# Patient Record
Sex: MALE | Race: WHITE | NOT HISPANIC OR LATINO | Employment: OTHER | URBAN - METROPOLITAN AREA
[De-identification: names, ages, dates, MRNs, and addresses within clinical notes are randomized per-mention and may not be internally consistent; named-entity substitution may affect disease eponyms.]

---

## 2017-10-18 ENCOUNTER — OFFICE VISIT (OUTPATIENT)
Dept: UROLOGY | Facility: CLINIC | Age: 82
End: 2017-10-18
Payer: COMMERCIAL

## 2017-10-18 VITALS
HEIGHT: 68 IN | SYSTOLIC BLOOD PRESSURE: 122 MMHG | HEART RATE: 80 BPM | DIASTOLIC BLOOD PRESSURE: 80 MMHG | BODY MASS INDEX: 28.49 KG/M2 | WEIGHT: 188 LBS

## 2017-10-18 DIAGNOSIS — N40.0 ENLARGED PROSTATE: Primary | ICD-10-CM

## 2017-10-18 DIAGNOSIS — N40.0 ENLARGED PROSTATE: ICD-10-CM

## 2017-10-18 PROCEDURE — 51798 US URINE CAPACITY MEASURE: CPT | Performed by: UROLOGY

## 2017-10-18 PROCEDURE — 99203 OFFICE O/P NEW LOW 30 MIN: CPT | Mod: 25 | Performed by: UROLOGY

## 2017-10-18 ASSESSMENT — PAIN SCALES - GENERAL: PAINLEVEL: NO PAIN (0)

## 2017-10-18 NOTE — NURSING NOTE
Chief Complaint   Patient presents with     Benign Prostatic Hypertrophy     Patient is here for a 2nd opinion on enlarged prostate.     Lianna Ruiz LPN 2:33 PM October 18, 2017

## 2017-10-18 NOTE — PROGRESS NOTES
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 He has a PSA of 4.1 and a clinical stage of T1c. He has a 74 cc prostate volume and has no difficulty voiding. His postvoid residual recently was checked and was 41 cc.  He was diagnosed by Kendall Daniels M.D.-50 pages of outside records reviewed  He was referred to me by Jose Alberto Zepeda M.D.  Other past medical history: Aortic stenosis, former smoker  Medications: None  Allergies: None  Exam: Patient is here with his spouse. Normal appearance, normal vital signs, alert and oriented, normocephalic, normal respirations, neuro grossly intact  Assessment: Clinical stage TIc adenocarcinoma of the prostate-discussed all options, risks and follow-up. Do not recommend surgery, seed implants or cryotherapy. Discussed hormonal therapy alone or with radiation therapy, external radiation therapy, surveillance  Suggestions: Recommend the patient consider external radiation because of some grade 4 disease. His risk of progression on surveillance is moderate in the next 5 years.  He will see Daniel Zarco M.D. at [facility] regarding IMRT. No hormonal therapy recommended. See me in 4 weeks after radiation treatments are completed with PSA. Call with questions

## 2017-10-18 NOTE — LETTER
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 He has a PSA of 4.1 and a clinical stage of T1c. He has a 74 cc prostate volume and has no difficulty voiding. His postvoid residual recently was checked and was 41 cc.  He was diagnosed by Kendall Daniels M.D.-50 pages of outside records reviewed  He was referred to me by Jose Alberto Zepeda M.D.  Other past medical history: Aortic stenosis, former smoker  Medications: None  Allergies: None  Exam: Patient is here with his spouse. Normal appearance, normal vital signs, alert and oriented, normocephalic, normal respirations, neuro grossly intact  Assessment: Clinical stage TIc adenocarcinoma of the prostate-discussed all options, risks and follow-up. Do not recommend surgery, seed implants or cryotherapy. Discussed hormonal therapy alone or with radiation therapy, external radiation therapy, surveillance  Suggestions: Recommend the patient consider external radiation because of some grade 4 disease. His risk of progression on surveillance is moderate in the next 5 years.  He will see Daniel Zarco M.D. at [facility] regarding IMRT. No hormonal therapy recommended. See me in 4 weeks after radiation treatments are completed with PSA. Call with questions      Sudheer Jurado MD

## 2017-10-18 NOTE — MR AVS SNAPSHOT
"              After Visit Summary   10/18/2017    Dae Carpenter    MRN: [id number]           Patient Information     Date Of Birth          1935        Visit Information        Provider Department      10/18/2017 2:20 PM Sudheer Jurado MD [facility] [place]        Today's Diagnoses     Enlarged prostate           Follow-ups after your visit        Who to contact     If you have questions or need follow up information about today's clinic visit or your schedule please contact [facility]OLOG[facility] CL[facility] [place] directly at [phone].  Normal or non-critical lab and imaging results will be communicated to you by [employer], letter or phone within 4 business days after the clinic has received the results. If you do not hear from us within 7 days, please contact the clinic through Vimtyt or phone. If you have a critical or abnormal lab result, we will notify you by phone as soon as possible.  Submit refill requests through WorldRemit or call your pharmacy and they will forward the refill request to us. Please allow 3 business days for your refill to be completed.          Additional Information About Your Visit        MyChart Information     WorldRemit lets you send messages to your doctor, view your test results, renew your prescriptions, schedule appointments and more. To sign up, go to [email or web address] . Click on \"Log in\" on the left side of the screen, which will take you to the Welcome page. Then click on \"Sign up Now\" on the right side of the page.     You will be asked to enter the access code listed below, as well as some personal information. Please follow the directions to create your username and password.     Your access code is: [email or web address]  Expires: 2018  3:22 PM     Your access code will  in 90 days. If you need help or a new code, please call your [facility] or [phone].        Care EveryWhere ID     This is your Care " "EveryWhere ID. This could be used by other organizations to access your [place] medical records  [id number]        Your Vitals Were     Pulse Height BMI (Body Mass Index)             80 1.727 m (5' 8\") 28.59 kg/m2          Blood Pressure from Last 3 Encounters:   10/18/17 122/80   09/25/15 134/85    Weight from Last 3 Encounters:   10/18/17 85.3 kg (188 lb)   09/25/15 89.7 kg (197 lb 11.2 oz)              We Performed the Following     MEASURE POST-VOID RESIDUAL URINE/BLADDER CAPACITY, US NON-IMAGING     UA without Microscopic        Primary Care Provider Office Phone # Fax #    Nilesh Giron [phone]       [facility] [address] [facility] [address]        Equal Access to Services     YAMILET NATHAN : Hadii delvis ku hadasho Soomaali, waaxda luqadaha, qaybta kaalmada adeegyada, fadi sparks haynicole gilmore . So [facility] [phone].    ATENCIÓN: Si habla español, tiene a sibley disposición servicios gratuitos de asistencia lingüística. Kareename al [phone].    We comply with applicable federal civil rights laws and [place] laws. We do not discriminate on the basis of race, color, national origin, age, disability, sex, sexual orientation, or gender identity.            Thank you!     Thank you for choosing [facility]OLOG[facility] CL[facility] [place]  for your care. Our goal is always to provide you with excellent care. Hearing back from our patients is one way we can continue to improve our services. Please take a few minutes to complete the written survey that you may receive in the mail after your visit with us. Thank you!             Your Updated Medication List - Protect others around you: Learn how to safely use, store and throw away your medicines at www.disposemymeds.org.      Notice  As of 10/18/2017  3:22 PM    You have not been prescribed any medications.      "

## 2017-10-18 NOTE — LETTER
10/18/2017       RE: Dae Carpenter  [address]  [place] [facility] [facility] [address]     Dear Colleague,    Thank you for referring your patient, Dae Carpenter, to the [facility] [place] at [facility]. Please see a copy of my visit note below.    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 He has a PSA of 4.1 and a clinical stage of T1c. He has a 74 cc prostate volume and has no difficulty voiding. His postvoid residual recently was checked and was 41 cc.  He was diagnosed by Kendall Daniels M.D.-50 pages of outside records reviewed  He was referred to me by Jose Alberto Zepeda M.D.  Other past medical history: Aortic stenosis, former smoker  Medications: None  Allergies: None  Exam: Patient is here with his spouse. Normal appearance, normal vital signs, alert and oriented, normocephalic, normal respirations, neuro grossly intact  Assessment: Clinical stage TIc adenocarcinoma of the prostate-discussed all options, risks and follow-up. Do not recommend surgery, seed implants or cryotherapy. Discussed hormonal therapy alone or with radiation therapy, external radiation therapy, surveillance  Suggestions: Recommend the patient consider external radiation because of some grade 4 disease. His risk of progression on surveillance is moderate in the next 5 years.  He will see Daniel Zarco M.D. at [facility] regarding IMRT. No hormonal therapy recommended. See me in 4 weeks after radiation treatments are completed with PSA. Call with questions      Again, thank you for allowing me to participate in the care of your patient.      Sincerely,    Sudheer Jurado MD

## 2017-10-25 ENCOUNTER — TRANSFERRED RECORDS (OUTPATIENT)
Dept: HEALTH INFORMATION MANAGEMENT | Facility: CLINIC | Age: 82
End: 2017-10-25

## 2017-11-22 ENCOUNTER — HOSPITAL ENCOUNTER (OUTPATIENT)
Dept: GENERAL RADIOLOGY | Facility: CLINIC | Age: 82
Discharge: HOME | End: 2017-11-22
Attending: INTERNAL MEDICINE | Admitting: INTERNAL MEDICINE
Payer: MEDICARE

## 2017-11-22 DIAGNOSIS — K59.00 CONSTIPATION, UNSPECIFIED CONSTIPATION TYPE: ICD-10-CM

## 2017-11-22 PROCEDURE — 74000 XR ABDOMEN 1 VW: CPT

## 2017-12-21 ENCOUNTER — TRANSFERRED RECORDS (OUTPATIENT)
Dept: HEALTH INFORMATION MANAGEMENT | Facility: CLINIC | Age: 82
End: 2017-12-21

## 2018-01-29 DIAGNOSIS — N40.0 ENLARGED PROSTATE: ICD-10-CM

## 2018-01-29 DIAGNOSIS — N40.0 ENLARGED PROSTATE: Primary | ICD-10-CM

## 2018-01-29 LAB — PSA SERPL-MCNC: 1.6 NG/ML (ref 0–4)

## 2018-01-29 PROCEDURE — 84153 ASSAY OF PSA TOTAL: CPT | Performed by: UROLOGY

## 2018-01-29 PROCEDURE — 36415 COLL VENOUS BLD VENIPUNCTURE: CPT | Performed by: UROLOGY

## 2018-01-30 ENCOUNTER — OFFICE VISIT (OUTPATIENT)
Dept: UROLOGY | Facility: CLINIC | Age: 83
End: 2018-01-30
Payer: COMMERCIAL

## 2018-01-30 VITALS
SYSTOLIC BLOOD PRESSURE: 120 MMHG | WEIGHT: 190 LBS | HEIGHT: 68 IN | DIASTOLIC BLOOD PRESSURE: 62 MMHG | BODY MASS INDEX: 28.79 KG/M2 | HEART RATE: 80 BPM

## 2018-01-30 DIAGNOSIS — C61 MALIGNANT NEOPLASM OF PROSTATE (H): Primary | ICD-10-CM

## 2018-01-30 PROCEDURE — 99212 OFFICE O/P EST SF 10 MIN: CPT | Performed by: UROLOGY

## 2018-01-30 ASSESSMENT — PAIN SCALES - GENERAL: PAINLEVEL: NO PAIN (0)

## 2018-01-30 NOTE — NURSING NOTE
Has completed RARX  On 12-22-17 . Urinating ok. Did have some frequency with the treatments however that is improving. PSA done yesterday. Laura Soler LPN

## 2018-01-30 NOTE — MR AVS SNAPSHOT
"              After Visit Summary   1/30/2018    Dae Carpenter    MRN: [id number]           Patient Information     Date Of Birth          1935        Visit Information        Provider Department      1/30/2018 9:20 AM Sudheer Jurado MD [facility]         Follow-ups after your visit        Your next 10 appointments already scheduled     Jul 27, 2018  8:00 AM CDT   Return Visit with Sudheer Jurado MD   [facility]rology [facility] ([facility]    [address]  [address]  [place] [address] [phone]              Who to contact     If you have questions or need follow up information about today's clinic visit or your schedule please contact [facility] UROLOGY [facility] directly at [phone].  Normal or non-critical lab and imaging results will be communicated to you by Tryolabshart, letter or phone within 4 business days after the clinic has received the results. If you do not hear from us within 7 days, please contact the clinic through Tryolabshart or phone. If you have a critical or abnormal lab result, we will notify you by phone as soon as possible.  Submit refill requests through [employer] or call your pharmacy and they will forward the refill request to us. Please allow 3 business days for your refill to be completed.          Additional Information About Your Visit        MyChart Information     [employer] lets you send messages to your doctor, view your test results, renew your prescriptions, schedule appointments and more. To sign up, go to [email or web address] . Click on \"Log in\" on the left side of the screen, which will take you to the Welcome page. Then click on \"Sign up Now\" on the right side of the page.     You will be asked to enter the access code listed below, as well as some personal information. Please follow the directions to create your username and password.     Your " "access code is: [email or web address]  Expires: 2018  9:40 AM     Your access code will  in 90 days. If you need help or a new code, please call your [facility] or [phone].        Care EveryWhere ID     This is your Care EveryWhere ID. This could be used by other organizations to access your [place] medical records  [id number]        Your Vitals Were     Pulse Height BMI (Body Mass Index)             80 1.727 m (5' 8\") 28.89 kg/m2          Blood Pressure from Last 3 Encounters:   18 120/62   10/18/17 122/80   09/25/15 134/85    Weight from Last 3 Encounters:   18 86.2 kg (190 lb)   10/18/17 85.3 kg (188 lb)   09/25/15 89.7 kg (197 lb 11.2 oz)              Today, you had the following     No orders found for display       Primary Care Provider Office Phone # Fax #    Nilesh Giron [phone]       [facility] [address] [address]        Equal Access to Services     [facility]: Hadii aad ku hadasho Soomaali, waaxda luqadaha, qaybta kaalmada adeegyada, fadi sparks haynicole gilmore . So [facility] [phone].    ATENCIÓN: Si habla español, tiene a sibley disposición servicios gratuitos de asistencia lingüística. Llame al [phone].    We comply with applicable federal civil rights laws and [place] laws. We do not discriminate on the basis of race, color, national origin, age, disability, sex, sexual orientation, or gender identity.            Thank you!     Thank you for choosing [facility]OLOG[facility] CL[facility] [place]  for your care. Our goal is always to provide you with excellent care. Hearing back from our patients is one way we can continue to improve our services. Please take a few minutes to complete the written survey that you may receive in the mail after your visit with us. Thank you!             Your Updated Medication List - Protect others around you: Learn how to safely use, store and throw away your " medicines at www.disposemymeds.org.      Notice  As of 1/30/2018  9:40 AM    You have not been prescribed any medications.

## 2018-01-30 NOTE — PROGRESS NOTES
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 He recently completed a course of external radiation at [facility] with Daniel Zarco M.D.  He experienced some fatigue during the treatments; this is improving steadily and he and his wife are traveling to [place] for 2 months  Other past medical history: Aortic stenosis, former smoker  Medications: None  Allergies: None  Exam: Normal appearance, normal vital signs, alert and oriented, normocephalic, normal respirations, neuro grossly intact  Assessment: Clinical stage TIc, grade 3+4 adenocarcinoma prostate  PSA down to 1.60  Plan: See me again in 6 months for PSA

## 2018-01-30 NOTE — LETTER
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 He recently completed a course of external radiation at [facility] with Daniel Zarco M.D.  He experienced some fatigue during the treatments; this is improving steadily and he and his wife are traveling to [place] for 2 months  Other past medical history: Aortic stenosis, former smoker  Medications: None  Allergies: None  Exam: Normal appearance, normal vital signs, alert and oriented, normocephalic, normal respirations, neuro grossly intact  Assessment: Clinical stage TIc, grade 3+4 adenocarcinoma prostate  PSA down to 1.60  Plan: See me again in 6 months for PSA    Sudheer Jurado MD

## 2018-01-30 NOTE — LETTER
1/30/2018       RE: Dae Carpenter  [address]  [place] [address]     Dear Colleague,    Thank you for referring your patient, Dae Carpenter, to the [facility] UROLOGY CL[facility] [place] at [facility]. Please see a copy of my visit note below.    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 He recently completed a course of external radiation at [facility] with Daniel Zarco M.D.  He experienced some fatigue during the treatments; this is improving steadily and he and his wife are traveling to [place] for 2 months  Other past medical history: Aortic stenosis, former smoker  Medications: None  Allergies: None  Exam: Normal appearance, normal vital signs, alert and oriented, normocephalic, normal respirations, neuro grossly intact  Assessment: Clinical stage TIc, grade 3+4 adenocarcinoma prostate  PSA down to 1.60  Plan: See me again in 6 months for PSA    Again, thank you for allowing me to participate in the care of your patient.      Sincerely,    Sudheer Jurado MD

## 2018-05-02 LAB — EJECTION FRACTION: 58

## 2018-05-29 ENCOUNTER — HOSPITAL ENCOUNTER (EMERGENCY)
Facility: CLINIC | Age: 83
Discharge: HOME | End: 2018-05-29
Attending: EMERGENCY MEDICINE | Admitting: EMERGENCY MEDICINE
Payer: MEDICARE

## 2018-05-29 VITALS
OXYGEN SATURATION: 94 % | TEMPERATURE: 97.8 F | HEIGHT: 68 IN | RESPIRATION RATE: 16 BRPM | BODY MASS INDEX: 28.79 KG/M2 | SYSTOLIC BLOOD PRESSURE: 139 MMHG | WEIGHT: 190 LBS | DIASTOLIC BLOOD PRESSURE: 72 MMHG

## 2018-05-29 DIAGNOSIS — R42 DIZZINESS: ICD-10-CM

## 2018-05-29 LAB
ANION GAP SERPL CALCULATED.3IONS-SCNC: 7 MMOL/L (ref 3–14)
BASOPHILS # BLD AUTO: 0 10E9/L (ref 0–0.2)
BASOPHILS NFR BLD AUTO: 0.5 %
BUN SERPL-MCNC: 12 MG/DL (ref 7–30)
CALCIUM SERPL-MCNC: 8.4 MG/DL (ref 8.5–10.1)
CHLORIDE SERPL-SCNC: 102 MMOL/L (ref 94–109)
CO2 SERPL-SCNC: 27 MMOL/L (ref 20–32)
CREAT SERPL-MCNC: 0.91 MG/DL (ref 0.66–1.25)
DIFFERENTIAL METHOD BLD: ABNORMAL
EOSINOPHIL # BLD AUTO: 0.2 10E9/L (ref 0–0.7)
EOSINOPHIL NFR BLD AUTO: 2.4 %
ERYTHROCYTE [DISTWIDTH] IN BLOOD BY AUTOMATED COUNT: 13.1 % (ref 10–15)
GFR SERPL CREATININE-BSD FRML MDRD: 80 ML/MIN/1.7M2
GLUCOSE SERPL-MCNC: 161 MG/DL (ref 70–99)
HCT VFR BLD AUTO: 46.6 % (ref 40–53)
HGB BLD-MCNC: 16.1 G/DL (ref 13.3–17.7)
IMM GRANULOCYTES # BLD: 0.1 10E9/L (ref 0–0.4)
IMM GRANULOCYTES NFR BLD: 0.6 %
INTERPRETATION ECG - MUSE: NORMAL
LYMPHOCYTES # BLD AUTO: 3.4 10E9/L (ref 0.8–5.3)
LYMPHOCYTES NFR BLD AUTO: 42.7 %
MCH RBC QN AUTO: 33.2 PG (ref 26.5–33)
MCHC RBC AUTO-ENTMCNC: 34.5 G/DL (ref 31.5–36.5)
MCV RBC AUTO: 96 FL (ref 78–100)
MONOCYTES # BLD AUTO: 0.4 10E9/L (ref 0–1.3)
MONOCYTES NFR BLD AUTO: 5.3 %
NEUTROPHILS # BLD AUTO: 3.9 10E9/L (ref 1.6–8.3)
NEUTROPHILS NFR BLD AUTO: 48.5 %
NRBC # BLD AUTO: 0 10*3/UL
NRBC BLD AUTO-RTO: 0 /100
PLATELET # BLD AUTO: 164 10E9/L (ref 150–450)
POTASSIUM SERPL-SCNC: 4.1 MMOL/L (ref 3.4–5.3)
RBC # BLD AUTO: 4.85 10E12/L (ref 4.4–5.9)
SODIUM SERPL-SCNC: 136 MMOL/L (ref 133–144)
TROPONIN I SERPL-MCNC: <0.015 UG/L (ref 0–0.04)
WBC # BLD AUTO: 8.1 10E9/L (ref 4–11)

## 2018-05-29 PROCEDURE — 93005 ELECTROCARDIOGRAM TRACING: CPT

## 2018-05-29 PROCEDURE — 85025 COMPLETE CBC W/AUTO DIFF WBC: CPT | Performed by: EMERGENCY MEDICINE

## 2018-05-29 PROCEDURE — 80048 BASIC METABOLIC PNL TOTAL CA: CPT | Performed by: EMERGENCY MEDICINE

## 2018-05-29 PROCEDURE — 99284 EMERGENCY DEPT VISIT MOD MDM: CPT

## 2018-05-29 PROCEDURE — 84484 ASSAY OF TROPONIN QUANT: CPT | Performed by: EMERGENCY MEDICINE

## 2018-05-29 ASSESSMENT — ENCOUNTER SYMPTOMS
DIZZINESS: 1
COUGH: 1
VOMITING: 0
DIARRHEA: 0
FEVER: 0
SINUS PRESSURE: 1
WEAKNESS: 0
NECK PAIN: 0
HEADACHES: 0

## 2018-05-29 NOTE — ED AVS SNAPSHOT
Emergency Department    [address] [place] [address]    Phone:  [phone]    Fax:  [phone]                                       Dae Carpenter   MRN: [id number]    Department:   Emergency Department   Date of Visit:  5/29/2018           After Visit Summary Signature Page     I have received my discharge instructions, and my questions have been answered. I have discussed any challenges I see with this plan with the nurse or doctor.    ..........................................................................................................................................  Patient/Patient Representative Signature      ..........................................................................................................................................  Patient Representative Print Name and Relationship to Patient    ..................................................               ................................................  Date                                            Time    ..........................................................................................................................................  Reviewed by Signature/Title    ...................................................              ..............................................  Date                                                            Time

## 2018-05-29 NOTE — ED AVS SNAPSHOT
Emergency Department    [address] [facility] [address]    Phone:  [phone]    Fax:  [phone]                                       Dae Carpenter   MRN: [id number]    Department:   Emergency Department   Date of Visit:  5/29/2018           Patient Information     Date Of Birth          1935        Your diagnoses for this visit were:     Dizziness        You were seen by Reece Walker MD.      Follow-up Information     Follow up with Nilesh Giron In 3 days.    Specialty:  Internal Medicine    Contact information:    [facility]  [address]  [facility] [address] [phone]          Follow up with  Emergency Department.    Specialty:  EMERGENCY MEDICINE    Why:  As needed    Contact information:    [address] [facility] [address] [phone]        Discharge Instructions       Discharge Instructions  Dizziness (Lightheaded)  Today you were seen for dizziness.  Dizziness can be caused by many things and it can be very difficult to determine the cause of dizziness.  At this time, your provider has found no signs that your dizziness is due to a serious or life-threatening condition. However, sometimes there is a serious problem that does not show up right away, and it is important for you to follow up with your regular provider as instructed.  Generally, every Emergency Department visit should have a follow-up clinic visit with either a primary or a specialty clinic/provider. Please follow-up as instructed by your emergency provider today.      Return to the Emergency Department if:      You pass out (fainting or falling out), especially during exercise.      You develop chest pain, chest pressure or difficulty breathing.    Your feel an irregular heartbeat.    You have excessive vaginal bleeding, or blood in your stool or vomit (throw up).    You have a high fever.    Your symptoms get worse or more frequent.    If when  you begin to feel dizzy or lightheaded, it is important to sit down or lay down immediately to prevent injury from falling.  If you were given a prescription for medicine here today, be sure to read all of the information (including the package insert) that comes with your prescription.  This will include important information about the medicine, its side effects, and any warnings that you need to know about.  The pharmacist who fills the prescription can provide more information and answer questions you may have about the medicine.  If you have questions or concerns that the pharmacist cannot address, please call or return to the Emergency Department.   Remember that you can always come back to the Emergency Department if you are not able to see your regular provider in the amount of time listed above, if you get any new symptoms, or if there is anything that worries you.     Your next 10 appointments already scheduled     Jul 27, 2018  8:30 AM CDT   Return Visit with Sudheer Jurado MD   [facility] ([facility])    [address]  [address]  [facility] [address] [phone]              24 Hour Appointment Hotline       To make an appointment at any [facility], call [phone] ([phone]). If you don't have a family doctor or clinic, we will help you find one. [facility] are conveniently located to serve the needs of you and your family.             Review of your medicines      Notice     You have not been prescribed any medications.            Procedures and tests performed during your visit     Basic metabolic panel    CBC with platelets differential    EKG 12-lead, tracing only    Peripheral IV catheter    Troponin I      Orders Needing Specimen Collection     None      Pending Results     Date and Time Order Name Status Description    5/29/2018 1135 EKG 12-lead, tracing only Preliminary             Pending Culture Results     No  orders found from 5/27/2018 to 5/30/2018.            Pending Results Instructions     If you had any lab results that were not finalized at the time of your Discharge, you can call the ED Lab Result RN at [phone]. You will be contacted by this team for any positive Lab results or changes in treatment. The nurses are available 7 days a week from 10A to 6:30P.  You can leave a message 24 hours per day and they will return your call.        Test Results From Your Hospital Stay        5/29/2018 12:01 PM      Component Results     Component Value Ref Range & Units Status    WBC 8.1 4.0 - 11.0 10e9/L Final    RBC Count 4.85 4.4 - 5.9 10e12/L Final    Hemoglobin 16.1 13.3 - 17.7 g/dL Final    Hematocrit 46.6 40.0 - 53.0 % Final    MCV 96 78 - 100 fl Final    MCH 33.2 (H) 26.5 - 33.0 pg Final    MCHC 34.5 31.5 - 36.5 g/dL Final    RDW 13.1 10.0 - 15.0 % Final    Platelet Count 164 150 - 450 10e9/L Final    Diff Method Automated Method  Final    % Neutrophils 48.5 % Final    % Lymphocytes 42.7 % Final    % Monocytes 5.3 % Final    % Eosinophils 2.4 % Final    % Basophils 0.5 % Final    % Immature Granulocytes 0.6 % Final    Nucleated RBCs 0 0 /100 Final    Absolute Neutrophil 3.9 1.6 - 8.3 10e9/L Final    Absolute Lymphocytes 3.4 0.8 - 5.3 10e9/L Final    Absolute Monocytes 0.4 0.0 - 1.3 10e9/L Final    Absolute Eosinophils 0.2 0.0 - 0.7 10e9/L Final    Absolute Basophils 0.0 0.0 - 0.2 10e9/L Final    Abs Immature Granulocytes 0.1 0 - 0.4 10e9/L Final    Absolute Nucleated RBC 0.0  Final         5/29/2018 12:22 PM      Component Results     Component Value Ref Range & Units Status    Sodium 136 133 - 144 mmol/L Final    Potassium 4.1 3.4 - 5.3 mmol/L Final    Chloride 102 94 - 109 mmol/L Final    Carbon Dioxide 27 20 - 32 mmol/L Final    Anion Gap 7 3 - 14 mmol/L Final    Glucose 161 (H) 70 - 99 mg/dL Final    Urea Nitrogen 12 7 - 30 mg/dL Final    Creatinine 0.91 0.66 - 1.25 mg/dL Final    GFR Estimate 80 >60  mL/min/1.7m2 Final    Non  GFR Calc    GFR Estimate If Black >90 >60 mL/min/1.7m2 Final    African American GFR Calc    Calcium 8.4 (L) 8.5 - 10.1 mg/dL Final         5/29/2018 12:25 PM      Component Results     Component Value Ref Range & Units Status    Troponin I ES <0.015 0.000 - 0.045 ug/L Final    The 99th percentile for upper reference range is 0.045 ug/L.  Troponin values   in the range of 0.045 - 0.120 ug/L may be associated with risks of adverse   clinical events.                  Clinical Quality Measure: Blood Pressure Screening     Your blood pressure was checked while you were in the emergency department today. The last reading we obtained was  BP: 139/72 . Please read the guidelines below about what these numbers mean and what you should do about them.  If your systolic blood pressure (the top number) is less than 120 and your diastolic blood pressure (the bottom number) is less than 80, then your blood pressure is normal. There is nothing more that you need to do about it.  If your systolic blood pressure (the top number) is 120-139 or your diastolic blood pressure (the bottom number) is 80-89, your blood pressure may be higher than it should be. You should have your blood pressure rechecked within a year by a primary care provider.  If your systolic blood pressure (the top number) is 140 or greater or your diastolic blood pressure (the bottom number) is 90 or greater, you may have high blood pressure. High blood pressure is treatable, but if left untreated over time it can put you at risk for heart attack, stroke, or kidney failure. You should have your blood pressure rechecked by a primary care provider within the next 4 weeks.  If your provider in the emergency department today gave you specific instructions to follow-up with your doctor or provider even sooner than that, you should follow that instruction and not wait for up to 4 weeks for your follow-up visit.        Thank you  "for choosing [facility]       Thank you for choosing [facility] for your care. Our goal is always to provide you with excellent care. Hearing back from our patients is one way we can continue to improve our services. Please take a few minutes to complete the written survey that you may receive in the mail after you visit with us. Thank you!        [employer] Information     People to Remember lets you send messages to your doctor, view your test results, renew your prescriptions, schedule appointments and more. To sign up, go to [email or web address] . Click on \"Log in\" on the left side of the screen, which will take you to the Welcome page. Then click on \"Sign up Now\" on the right side of the page.     You will be asked to enter the access code listed below, as well as some personal information. Please follow the directions to create your username and password.     Your access code is: [email or web address]  Expires: 2018  1:01 PM     Your access code will  in 90 days. If you need help or a new code, please call your [facility] clinic or [phone].        Care EveryWhere ID     This is your Care EveryWhere ID. This could be used by other organizations to access your [facility] medical records  [id number]        Equal Access to Services     YAMILET NATHAN : Meghana browno Jakub, waaxda luqadaha, qaybta kaalmada adeandresyada, fadi licona. So [facility] [phone].    ATENCIÓN: Si habla español, tiene a sibley disposición servicios gratuitos de asistencia lingüística. Llame al [phone].    We comply with applicable federal civil rights laws and [place] laws. We do not discriminate on the basis of race, color, national origin, age, disability, sex, sexual orientation, or gender identity.            After Visit Summary       This is your record. Keep this with you and show to your community pharmacist(s) and doctor(s) at your next visit.                  "

## 2018-05-29 NOTE — ED PROVIDER NOTES
"  History     Chief Complaint:  Dizziness    HPI   Dae Carpenter is a 83 year old male who presents to the emergency department today for evaluation of dizziness. The patient reports around 45 minutes prior to arrival while he was walking around his house he had a transient dizziness episode lasting around 5 minutes. He denies any associated headache, stating \"I just lost my equilibrium\". This concerned him so he presented in the emergency department for evaluation. The patient states that over the last 6-8 months he has not been feeling as steady while walking and has had multiple other dizzy spells. Moving his head side to side does not exacerbate the pain.  The patient also endorses a recent sinus infection and cough for which he is being treated with antibiotics. The patient denies any ear pain, extremity weakness, speech difficulty, fevers, worse neck pain, chest pain, vomiting, diarrhea.    Allergies:  No Known Drug Allergies      Medications:    Albuterol inhaler     Past Medical History:    Asthma   Prostate cancer   Hyperlipidemia   Osteoarthritis   Aortic stenosis  Murmur     Past Surgical History:    History reviewed. No pertinent past surgical history.     Family History:    Heart disease     Social History:  The patient was accompanied to the ED by himself.  Smoking Status: Former smoker  Smokeless Tobacco: No  Alcohol Use: Yes    Marital Status:        Review of Systems   Constitutional: Negative for fever.   HENT: Positive for postnasal drip and sinus pressure. Negative for ear pain.    Respiratory: Positive for cough.    Cardiovascular: Negative for chest pain.   Gastrointestinal: Negative for diarrhea and vomiting.   Musculoskeletal: Negative for neck pain.   Neurological: Positive for dizziness. Negative for weakness and headaches.   All other systems reviewed and are negative.      Physical Exam   First Vitals:  BP: 139/72  Heart Rate: 66  Temp: 97.8  F (36.6  C)  Resp: 18  Height: 172.7 " "cm (5' 8\")  Weight: 86.2 kg (190 lb)  SpO2: 94 %    Physical Exam   Eyes:  The pupils are equal and round    Conjunctivae and sclerae are normal  ENT:    The nose is normal    Pinnae are normal    The oropharynx is normal    TM normal bilaterally  CV:  Regular rate and rhythm     No edema  Resp:  Lungs are clear    Non-labored    No rales    No wheezing   GI:  Abdomen is soft, there is no rigidity    No distension    No rebound tenderness   MS:  Normal muscular tone    No asymmetric leg swelling  Skin:  No rash or acute skin lesions noted  Neuro:   Awake, alert, GCS 15    Speech is normal and fluent    Face is symmetric    Moves all extremities    Normal finger-nose-finger     strength equal bilaterally    Equal sensation bilaterally    Hip flexion 5/5 bilaterally      Gait is normal.     Emergency Department Course     ECG:  Indication: Dizziness   Completed at 1158.  Read at 1200.   Sinus bradycardia. Otherwise normal ECG.   Rate 57 bpm. KY interval 172. QRS duration 96. QT/QTc 416/404. P-R-T axes 34 -*12 55.     Laboratory:  Laboratory findings were communicated with the patient who voiced understanding of the findings.    CBC: WBC 8.1, HGB 16.1,   BMP: Glucose 161 (H), Calcium 8.4 (L), o/w WNL (Creatinine 0.91)   Troponin (Collected 1100): <0.015     Emergency Department Course:  Nursing notes and vitals reviewed.  1126 I performed an exam of the patient as documented above.   IV was inserted and blood was drawn for laboratory testing, results above.   1300 Patient rechecked and updated.    Findings and plan explained to the Patient. Patient discharged home with instructions regarding supportive care, medications, and reasons to return. The importance of close follow-up was reviewed. I personally reviewed the laboratory results with the Patient and answered all related questions prior to discharge.     Impression & Plan      Medical Decision Making:  Dae is an 83 year old male who presents to " emergency department with dizziness. He reports feeling dizzy this morning. It lasted 4-5 minutes. Is now resolved.  He feels slightly off currently,but says he has been able to ambulate. No headache. No vision changes. No change in his hearing. He has had a sinus infection recently which could be contributing to his symptoms. His neurologic exam again is normal. At this time I don't see any indication for MRI.  Certainly he is elderly, but he has normal exam and no symptoms here. I do think there could be some contribution from his sinus infection. BPV is also a possibility given the brief nature of his symptoms. Unable to reproduce symptoms here today. Neurologic exam again is normal. EKG and troponin are normal. Labs are otherwise reassuring.  Patient requesting discharge, which I feel is appropriate.  Plan for discharge and follow up with primary care provider.    Diagnosis:    ICD-10-CM    1. Dizziness R42        Disposition:  Discharged to home.     Scribe Disclosure:  I, Yousif Pulido, am serving as a scribe at 11:17 AM on 5/29/2018 to document services personally performed by Reece Walker MD based on my observations and the provider's statements to me.    5/29/2018    EMERGENCY DEPARTMENT       Reece Walker MD  05/29/18 1992

## 2018-07-20 DIAGNOSIS — C61 MALIGNANT NEOPLASM OF PROSTATE (H): Primary | ICD-10-CM

## 2018-07-27 ENCOUNTER — OFFICE VISIT (OUTPATIENT)
Dept: UROLOGY | Facility: CLINIC | Age: 83
End: 2018-07-27
Payer: COMMERCIAL

## 2018-07-27 VITALS
DIASTOLIC BLOOD PRESSURE: 64 MMHG | SYSTOLIC BLOOD PRESSURE: 120 MMHG | BODY MASS INDEX: 27.89 KG/M2 | HEART RATE: 74 BPM | OXYGEN SATURATION: 96 % | WEIGHT: 184 LBS | HEIGHT: 68 IN

## 2018-07-27 DIAGNOSIS — C61 MALIGNANT NEOPLASM OF PROSTATE (H): ICD-10-CM

## 2018-07-27 LAB
ALBUMIN UR-MCNC: ABNORMAL MG/DL
APPEARANCE UR: CLEAR
BILIRUB UR QL STRIP: ABNORMAL
COLOR UR AUTO: YELLOW
GLUCOSE UR STRIP-MCNC: NEGATIVE MG/DL
HGB UR QL STRIP: NEGATIVE
KETONES UR STRIP-MCNC: NEGATIVE MG/DL
LEUKOCYTE ESTERASE UR QL STRIP: NEGATIVE
NITRATE UR QL: NEGATIVE
PH UR STRIP: 5.5 PH (ref 5–7)
PSA SERPL-MCNC: 0.8 NG/ML (ref 0–4)
SOURCE: ABNORMAL
SP GR UR STRIP: >1.03 (ref 1–1.03)
UROBILINOGEN UR STRIP-ACNC: 0.2 EU/DL (ref 0.2–1)

## 2018-07-27 PROCEDURE — 81003 URINALYSIS AUTO W/O SCOPE: CPT | Performed by: UROLOGY

## 2018-07-27 PROCEDURE — 36415 COLL VENOUS BLD VENIPUNCTURE: CPT | Performed by: UROLOGY

## 2018-07-27 PROCEDURE — 99213 OFFICE O/P EST LOW 20 MIN: CPT | Performed by: UROLOGY

## 2018-07-27 PROCEDURE — 84153 ASSAY OF PSA TOTAL: CPT | Performed by: UROLOGY

## 2018-07-27 RX ORDER — OMEPRAZOLE 40 MG/1
40 CAPSULE, DELAYED RELEASE ORAL
COMMUNITY
Start: 2018-07-10

## 2018-07-27 RX ORDER — ALBUTEROL SULFATE 90 UG/1
2 AEROSOL, METERED RESPIRATORY (INHALATION)
COMMUNITY
Start: 2018-07-10

## 2018-07-27 RX ORDER — CLINDAMYCIN HCL 300 MG
300 CAPSULE ORAL
COMMUNITY
Start: 2018-07-18 | End: 2018-08-01

## 2018-07-27 RX ORDER — IPRATROPIUM BROMIDE AND ALBUTEROL SULFATE 2.5; .5 MG/3ML; MG/3ML
3 SOLUTION RESPIRATORY (INHALATION)
COMMUNITY
Start: 2018-07-24

## 2018-07-27 ASSESSMENT — PAIN SCALES - GENERAL: PAINLEVEL: NO PAIN (0)

## 2018-07-27 NOTE — LETTER
7/27/2018       RE: Dae Carpenter  [address]  [facility] [address]     Dear Colleague,    Thank you for referring your patient, Dae Carpenter, to the [facility] [place] at [facility]. Please see a copy of my visit note below.    Dae Carpenter is an 83-year-old gentleman who was found to have grade 3+4 adenocarcinoma of the prostate and was treated with radiation therapy. His PSA is down to 0.80. He is having no difficulty voiding, dysuria or hematuria. He is still working and is a builder.  Other past medical history: Former smoker  Medications: Albuterol, DuoNeb, omeprazole  Allergies: None  Review of systems: Occasional pain in left groin area, constipation  Exam: Alert and oriented, normal vital signs. Normal appearance, normocephalic, normal respirations, neuro grossly intact. Groins without hernias or adenopathy, normal testicles  Assessment: Grade 3+4 adenocarcinoma of the prostate  Plan: Continue to follow at 6 month intervals with PSA. Discussed bowel program    Again, thank you for allowing me to participate in the care of your patient.      Sincerely,    Sudheer Jurado MD

## 2018-07-27 NOTE — PROGRESS NOTES
Dae Carpenter is an 83-year-old gentleman who was found to have grade 3+4 adenocarcinoma of the prostate and was treated with radiation therapy. His PSA is down to 0.80. He is having no difficulty voiding, dysuria or hematuria. He is still working and is a builder.  Other past medical history: Former smoker  Medications: Albuterol, DuoNeb, omeprazole  Allergies: None  Review of systems: Occasional pain in left groin area, constipation  Exam: Alert and oriented, normal vital signs. Normal appearance, normocephalic, normal respirations, neuro grossly intact. Groins without hernias or adenopathy, normal testicles  Assessment: Grade 3+4 adenocarcinoma of the prostate  Plan: Continue to follow at 6 month intervals with PSA. Discussed bowel program

## 2018-07-27 NOTE — MR AVS SNAPSHOT
After Visit Summary   7/27/2018    Dae Carpenter    MRN: [id number]           Patient Information     Date Of Birth          1935        Visit Information        Provider Department      7/27/2018 8:30 AM Sudheer Jurado MD [facility]        Today's Diagnoses     Malignant neoplasm of prostate (H)           Follow-ups after your visit        Follow-up notes from your care team     Return in about 6 months (around 1/27/2019) for PSA.      Your next 10 appointments already scheduled     Jul 27, 2018  8:30 AM CDT   Return Visit with Sudheer Jurado MD   [facility] ([facility])    [address]  [address]  [place] [address] [phone]            Jan 30, 2019  8:30 AM CST   Return Visit with Sudheer Jurado MD   [facility] ([facility])    [address]  [address]  [place] [address] [phone]              Future tests that were ordered for you today     Open Future Orders        Priority Expected Expires Ordered    PSA Diag Urologic Phys Routine 1/27/2019 7/27/2019 7/27/2018            Who to contact     If you have questions or need follow up information about today's clinic visit or your schedule please contact [facility] directly at [phone].  Normal or non-critical lab and imaging results will be communicated to you by MyChart, letter or phone within 4 business days after the clinic has received the results. If you do not hear from us within 7 days, please contact the clinic through MyChart or phone. If you have a critical or abnormal lab result, we will notify you by phone as soon as possible.  Submit refill requests through [employer] or call your pharmacy and they will forward the refill request to us. Please allow 3 business days for your refill to be completed.           "Additional Information About Your Visit        Care EveryWhere ID     This is your Care EveryWhere ID. This could be used by other organizations to access your [facility] medical records  [id number]        Your Vitals Were     Pulse Height Pulse Oximetry BMI (Body Mass Index)          74 1.727 m (5' 8\") 96% 27.98 kg/m2         Blood Pressure from Last 3 Encounters:   07/27/18 120/64   05/29/18 139/72   01/30/18 120/62    Weight from Last 3 Encounters:   07/27/18 83.5 kg (184 lb)   05/29/18 86.2 kg (190 lb)   01/30/18 86.2 kg (190 lb)              We Performed the Following     PSA Diag Urologic Phys [[id number]]     UA without Microscopic [[id number]]        Primary Care Provider Office Phone # Fax #    Nilesh Giron [phone]       [facility] [address] [facility] [address]        Equal Access to Services     YAMILET NATHAN : Hadii aad ku hadasho Soomaali, waaxda luqadaha, qaybta kaalmada adeegyada, waxay idiin hayhodann alexander gilmore . So [facility] [phone].    ATENCIÓN: Si habla español, tiene a sibley disposición servicios gratuitos de asistencia lingüística. Llame al [phone].    We comply with applicable federal civil rights laws and [place] laws. We do not discriminate on the basis of race, color, national origin, age, disability, sex, sexual orientation, or gender identity.            Thank you!     Thank you for choosing [facility] [place]  for your care. Our goal is always to provide you with excellent care. Hearing back from our patients is one way we can continue to improve our services. Please take a few minutes to complete the written survey that you may receive in the mail after your visit with us. Thank you!             Your Updated Medication List - Protect others around you: Learn how to safely use, store and throw away your medicines at www.disposemymeds.org.          This list is accurate as of 7/27/18  8:29 AM.  Always use " your most recent med list.                   Brand Name Dispense Instructions for use Diagnosis    albuterol 108 (90 Base) MCG/ACT Inhaler    PROAIR HFA/PROVENTIL HFA/VENTOLIN HFA     Inhale 2 puffs into the lungs        clindamycin 300 MG capsule    CLEOCIN     Take 300 mg by mouth        ipratropium - albuterol 0.5 mg/2.5 mg/3 mL 0.5-2.5 (3) MG/3ML neb solution    DUONEB     Inhale 3 mLs into the lungs        omeprazole 40 MG capsule    priLOSEC     Take 40 mg by mouth

## 2019-01-30 ENCOUNTER — OFFICE VISIT (OUTPATIENT)
Dept: UROLOGY | Facility: CLINIC | Age: 84
End: 2019-01-30
Payer: COMMERCIAL

## 2019-01-30 VITALS
SYSTOLIC BLOOD PRESSURE: 118 MMHG | HEIGHT: 68 IN | WEIGHT: 188 LBS | HEART RATE: 77 BPM | DIASTOLIC BLOOD PRESSURE: 70 MMHG | OXYGEN SATURATION: 94 % | BODY MASS INDEX: 28.49 KG/M2

## 2019-01-30 DIAGNOSIS — C61 MALIGNANT NEOPLASM OF PROSTATE (H): ICD-10-CM

## 2019-01-30 LAB — PSA SERPL-MCNC: 0.48 NG/ML (ref 0–4)

## 2019-01-30 PROCEDURE — 84153 ASSAY OF PSA TOTAL: CPT | Performed by: UROLOGY

## 2019-01-30 PROCEDURE — 36415 COLL VENOUS BLD VENIPUNCTURE: CPT | Performed by: UROLOGY

## 2019-01-30 PROCEDURE — 99212 OFFICE O/P EST SF 10 MIN: CPT | Performed by: UROLOGY

## 2019-01-30 ASSESSMENT — PAIN SCALES - GENERAL: PAINLEVEL: EXTREME PAIN (8)

## 2019-01-30 ASSESSMENT — MIFFLIN-ST. JEOR: SCORE: 1517.26

## 2019-01-30 NOTE — LETTER
RE: Dae Carpenter  [address]  [facility] [address]     Dear Colleague,    Thank you for referring your patient, Dae Carpenter, to the [facility] [place] at [facility]. Please see a copy of my visit note below.    Abdelrahman Carpenter is an 84-year-old gentleman who was treated with radiation several years ago for a grade 3+4 adenocarcinoma of the prostate.  His PSA is stable at 0.48 this morning.  He looks well and has no complaints.  He denies any dysuria or hematuria or difficulty voiding.  Other past medical history: Former smoker  Medications: Albuterol, DuoNeb, omeprazole  Allergies: None  Exam: Normal appearance, alert and oriented, normal vital signs, with spouse.  Normocephalic, normal respirations, neuro grossly intact  Urinalysis: Pending  Assessment: Grade 3+4 adenocarcinoma of the prostate-doing well after radiation therapy  Plan: See me in 6 months with PSA    Again, thank you for allowing me to participate in the care of your patient.      Sincerely,    Sudheer Jurado MD

## 2019-01-30 NOTE — PROGRESS NOTES
Abdelrahman Carpenter is an 84-year-old gentleman who was treated with radiation several years ago for a grade 3+4 adenocarcinoma of the prostate.  His PSA is stable at 0.48 this morning.  He looks well and has no complaints.  He denies any dysuria or hematuria or difficulty voiding.  Other past medical history: Former smoker  Medications: Albuterol, DuoNeb, omeprazole  Allergies: None  Exam: Normal appearance, alert and oriented, normal vital signs, with spouse.  Normocephalic, normal respirations, neuro grossly intact  Urinalysis: Pending  Assessment: Grade 3+4 adenocarcinoma of the prostate-doing well after radiation therapy  Plan: See me in 6 months with PSA

## 2019-01-30 NOTE — NURSING NOTE
Chief Complaint   Patient presents with     RECHECK     Pt is here for six months for same day PSA.      Sarah Moya CMA

## 2019-07-08 LAB — EJECTION FRACTION: 65

## 2019-07-10 ENCOUNTER — OFFICE VISIT (OUTPATIENT)
Dept: UROLOGY | Facility: CLINIC | Age: 84
End: 2019-07-10
Payer: COMMERCIAL

## 2019-07-10 VITALS
DIASTOLIC BLOOD PRESSURE: 70 MMHG | HEIGHT: 68 IN | WEIGHT: 188.9 LBS | BODY MASS INDEX: 28.63 KG/M2 | HEART RATE: 66 BPM | OXYGEN SATURATION: 96 % | SYSTOLIC BLOOD PRESSURE: 120 MMHG

## 2019-07-10 DIAGNOSIS — C61 MALIGNANT NEOPLASM OF PROSTATE (H): ICD-10-CM

## 2019-07-10 LAB
ALBUMIN UR-MCNC: NEGATIVE MG/DL
APPEARANCE UR: CLEAR
BILIRUB UR QL STRIP: NEGATIVE
COLOR UR AUTO: YELLOW
GLUCOSE UR STRIP-MCNC: NEGATIVE MG/DL
HGB UR QL STRIP: NEGATIVE
KETONES UR STRIP-MCNC: NEGATIVE MG/DL
LEUKOCYTE ESTERASE UR QL STRIP: NEGATIVE
NITRATE UR QL: NEGATIVE
PH UR STRIP: 5 PH (ref 5–7)
PSA SERPL-MCNC: 0.28 NG/ML (ref 0–4)
SOURCE: NORMAL
SP GR UR STRIP: 1.02 (ref 1–1.03)
UROBILINOGEN UR STRIP-ACNC: 0.2 EU/DL (ref 0.2–1)

## 2019-07-10 PROCEDURE — 36415 COLL VENOUS BLD VENIPUNCTURE: CPT | Performed by: UROLOGY

## 2019-07-10 PROCEDURE — 84153 ASSAY OF PSA TOTAL: CPT | Performed by: UROLOGY

## 2019-07-10 PROCEDURE — 99213 OFFICE O/P EST LOW 20 MIN: CPT | Performed by: UROLOGY

## 2019-07-10 PROCEDURE — 81003 URINALYSIS AUTO W/O SCOPE: CPT | Performed by: UROLOGY

## 2019-07-10 RX ORDER — ASPIRIN 81 MG/1
81 TABLET, CHEWABLE ORAL DAILY
COMMUNITY

## 2019-07-10 ASSESSMENT — PAIN SCALES - GENERAL: PAINLEVEL: NO PAIN (0)

## 2019-07-10 ASSESSMENT — MIFFLIN-ST. JEOR: SCORE: 1521.34

## 2019-07-10 NOTE — PROGRESS NOTES
Dae Carpenter is a pleasant 84-year-old male with a history of grade 3+4 adenocarcinoma of the prostate.  His cancer is at the left apex.  PSA was 4.1 at diagnosis and he had a clinical stage T1c tumor.  Prostate size was 74 ml.  He completed a course of external radiation therapy in January 2018.  His PSA is now down to 0.28.  He is having no voiding problems, dysuria or hematuria  Other past medical history: Aortic stenosis, former smoker  Medications: Albuterol, low-dose aspirin, DuoNeb, omeprazole  Allergies: None  Review of systems: As above.  Recently has had some dizziness and is being evaluated by his cardiologist.  Needs new hearing aids and will be seen at the [facility] for this  Exam: Alert and oriented, normocephalic, normal respirations.  Normal vital signs.  Neuro grossly intact  Assessment: Decreasing PSA after radiation therapy 18 months ago.  Plan: Continue to follow with me at 6-month intervals with PSA

## 2019-07-10 NOTE — NURSING NOTE
"Chief Complaint   Patient presents with     Prostate Cancer     Patient here today for SD PSA       Blood pressure 120/70, pulse 66, height 1.727 m (5' 8\"), weight 85.7 kg (188 lb 14.4 oz), SpO2 96 %. Body mass index is 28.72 kg/m .    There is no problem list on file for this patient.      No Known Allergies    Current Outpatient Medications   Medication Sig Dispense Refill     aspirin (ASA) 81 MG chewable tablet Take 81 mg by mouth daily       albuterol (PROAIR HFA/PROVENTIL HFA/VENTOLIN HFA) 108 (90 Base) MCG/ACT Inhaler Inhale 2 puffs into the lungs       ipratropium - albuterol 0.5 mg/2.5 mg/3 mL (DUONEB) 0.5-2.5 (3) MG/3ML neb solution Inhale 3 mLs into the lungs       omeprazole (PRILOSEC) 40 MG capsule Take 40 mg by mouth         Social History     Tobacco Use     Smoking status: Former Smoker     Smokeless tobacco: Never Used   Substance Use Topics     Alcohol use: Yes     Comment: occas     Drug use: No       UA RESULTS:  Recent Labs   Lab Test 07/10/19  0800   COLOR Yellow   APPEARANCE Clear   URINEGLC Negative   URINEBILI Negative   URINEKETONE Negative   SG 1.020   UBLD Negative   URINEPH 5.0   PROTEIN Negative   UROBILINOGEN 0.2   NITRITE Negative   LEUKEST Negative       "

## 2019-07-10 NOTE — LETTER
7/10/2019        RE: Dae Carpenter  [address]  [facility] [address]        Dae Carpenter is a pleasant 84-year-old male with a history of grade 3+4 adenocarcinoma of the prostate.  His cancer is at the left apex.  PSA was 4.1 at diagnosis and he had a clinical stage T1c tumor.  Prostate size was 74 ml.  He completed a course of external radiation therapy in January 2018.  His PSA is now down to 0.28.  He is having no voiding problems, dysuria or hematuria  Other past medical history: Aortic stenosis, former smoker  Medications: Albuterol, low-dose aspirin, DuoNeb, omeprazole  Allergies: None  Review of systems: As above.  Recently has had some dizziness and is being evaluated by his cardiologist.  Needs new hearing aids and will be seen at the [facility] for this  Exam: Alert and oriented, normocephalic, normal respirations.  Normal vital signs.  Neuro grossly intact  Assessment: Decreasing PSA after radiation therapy 18 months ago.  Plan: Continue to follow with me at 6-month intervals with PSA        Sincerely,        Sudheer Jurado MD

## 2019-07-10 NOTE — LETTER
7/10/2019       RE: Dae Carpenter  [address]  [facility] [address]     Dear Colleague,    Thank you for referring your patient, Dae Carpenter, to the [facility] UROLOGY CLINIC [place] at [facility]. Please see a copy of my visit note below.    Dae Carpenter is a pleasant 84-year-old male with a history of grade 3+4 adenocarcinoma of the prostate.  His cancer is at the left apex.  PSA was 4.1 at diagnosis and he had a clinical stage T1c tumor.  Prostate size was 74 ml.  He completed a course of external radiation therapy in January 2018.  His PSA is now down to 0.28.  He is having no voiding problems, dysuria or hematuria  Other past medical history: Aortic stenosis, former smoker  Medications: Albuterol, low-dose aspirin, DuoNeb, omeprazole  Allergies: None  Review of systems: As above.  Recently has had some dizziness and is being evaluated by his cardiologist.  Needs new hearing aids and will be seen at the [facility] for this  Exam: Alert and oriented, normocephalic, normal respirations.  Normal vital signs.  Neuro grossly intact  Assessment: Decreasing PSA after radiation therapy 18 months ago.  Plan: Continue to follow with me at 6-month intervals with PSA      Again, thank you for allowing me to participate in the care of your patient.      Sincerely,    Sudheer Jurado MD

## 2019-07-26 ENCOUNTER — TRANSFERRED RECORDS (OUTPATIENT)
Dept: HEALTH INFORMATION MANAGEMENT | Facility: CLINIC | Age: 84
End: 2019-07-26

## 2019-08-01 ENCOUNTER — TRANSFERRED RECORDS (OUTPATIENT)
Dept: HEALTH INFORMATION MANAGEMENT | Facility: CLINIC | Age: 84
End: 2019-08-01

## 2020-01-02 DIAGNOSIS — C61 MALIGNANT NEOPLASM OF PROSTATE (H): Primary | ICD-10-CM

## 2020-01-08 DIAGNOSIS — C61 MALIGNANT NEOPLASM OF PROSTATE (H): ICD-10-CM

## 2020-01-08 LAB — PSA SERPL-MCNC: 0.36 NG/ML (ref 0–4)

## 2020-01-08 PROCEDURE — 84153 ASSAY OF PSA TOTAL: CPT | Performed by: UROLOGY

## 2020-01-08 PROCEDURE — 36415 COLL VENOUS BLD VENIPUNCTURE: CPT | Performed by: UROLOGY

## 2020-01-31 DIAGNOSIS — C61 PROSTATE CANCER (H): Primary | ICD-10-CM

## 2020-07-09 ENCOUNTER — TELEPHONE (OUTPATIENT)
Dept: UROLOGY | Facility: CLINIC | Age: 85
End: 2020-07-09

## 2020-07-09 NOTE — TELEPHONE ENCOUNTER
Patient called nurse line and reports that he is having level 3 to 4 out of ten pain for the past month or so. Patient has not taken anything for prostate pain. He is now done with radiation and wanted to run it by MD. Message sent to MD. Nayana Nicole LPN

## 2020-07-10 DIAGNOSIS — C61 PROSTATE CANCER (H): Primary | ICD-10-CM

## 2020-07-14 ENCOUNTER — HOSPITAL ENCOUNTER (OUTPATIENT)
Dept: LAB | Facility: CLINIC | Age: 85
Discharge: HOME | End: 2020-07-14
Attending: UROLOGY | Admitting: UROLOGY
Payer: COMMERCIAL

## 2020-07-14 DIAGNOSIS — C61 PROSTATE CANCER (H): ICD-10-CM

## 2020-07-14 LAB — PSA SERPL-MCNC: 0.49 UG/L (ref 0–4)

## 2020-07-14 PROCEDURE — 36415 COLL VENOUS BLD VENIPUNCTURE: CPT | Performed by: UROLOGY

## 2020-07-14 PROCEDURE — 84153 ASSAY OF PSA TOTAL: CPT | Performed by: UROLOGY

## 2020-07-15 ENCOUNTER — OFFICE VISIT (OUTPATIENT)
Dept: UROLOGY | Facility: CLINIC | Age: 85
End: 2020-07-15
Payer: COMMERCIAL

## 2020-07-15 VITALS
BODY MASS INDEX: 27.58 KG/M2 | DIASTOLIC BLOOD PRESSURE: 82 MMHG | WEIGHT: 182 LBS | SYSTOLIC BLOOD PRESSURE: 120 MMHG | HEIGHT: 68 IN

## 2020-07-15 DIAGNOSIS — C61 PROSTATE CANCER (H): Primary | ICD-10-CM

## 2020-07-15 LAB
ALBUMIN UR-MCNC: NEGATIVE MG/DL
APPEARANCE UR: CLEAR
BILIRUB UR QL STRIP: NEGATIVE
COLOR UR AUTO: YELLOW
GLUCOSE UR STRIP-MCNC: NEGATIVE MG/DL
HGB UR QL STRIP: NEGATIVE
KETONES UR STRIP-MCNC: NEGATIVE MG/DL
LEUKOCYTE ESTERASE UR QL STRIP: NEGATIVE
NITRATE UR QL: NEGATIVE
PH UR STRIP: 6 PH (ref 5–7)
RESIDUAL VOLUME (RV) (EXTERNAL): 46
SOURCE: NORMAL
SP GR UR STRIP: 1.01 (ref 1–1.03)
UROBILINOGEN UR STRIP-ACNC: 0.2 EU/DL (ref 0.2–1)

## 2020-07-15 PROCEDURE — 99213 OFFICE O/P EST LOW 20 MIN: CPT | Mod: 25 | Performed by: UROLOGY

## 2020-07-15 PROCEDURE — 51798 US URINE CAPACITY MEASURE: CPT | Performed by: UROLOGY

## 2020-07-15 PROCEDURE — 81003 URINALYSIS AUTO W/O SCOPE: CPT | Mod: QW | Performed by: UROLOGY

## 2020-07-15 ASSESSMENT — MIFFLIN-ST. JEOR: SCORE: 1485.05

## 2020-07-15 ASSESSMENT — PAIN SCALES - GENERAL: PAINLEVEL: NO PAIN (0)

## 2020-07-15 NOTE — PROGRESS NOTES
Dae Carpenter is an 85-year-old gentleman who was treated with external radiation therapy 2-1/2 years ago for grade 3+4 adenocarcinoma of the prostate, clinical stage T1c and PSA 4.1.  His PSA is now 0.46.  His lucila was last year at 0.28.  He has been having electric shocks in the pelvic area from time to time.  He seems to be voiding well and has had no dysuria or hematuria.  His urinalysis this morning is normal and his postvoid residual is only 40 cc.  Other past medical history: Former smoker  Medications: Baby aspirin, Prilosec  Allergies: None  Review of systems: As above.  Exam: Alert and oriented, normal appearance, normal vital signs.  Normal respirations, neuro grossly intact.  Normal sphincter tone, no rectal mass or impaction, no palpable prostate tissue and no abnormality noted  Assessment: History of adenocarcinoma the prostate, chronic constipation-he needs to drink less coffee, eliminate his wine at dinner and eat more fruit and vegetables in addition to taking his MiraLAX  Follow-up in 6 months with me with PSA and for digital rectal exam

## 2020-07-15 NOTE — NURSING NOTE
Chief Complaint   Patient presents with     Prostate Cancer     Review latest PSA     PVR:  46 mL    Sho Morejon, EMT

## 2020-07-15 NOTE — LETTER
7/15/2020       RE: Dae Carpenter  [address]  [facility] [address]     Dear Colleague,    Thank you for referring your patient, Dae Carpenter, to the [facility] UROLOGY CLINIC [place] at [facility]. Please see a copy of my visit note below.    Dae Carpenter is an 85-year-old gentleman who was treated with external radiation therapy 2-1/2 years ago for grade 3+4 adenocarcinoma of the prostate, clinical stage T1c and PSA 4.1.  His PSA is now 0.46.  His lucila was last year at 0.28.  He has been having electric shocks in the pelvic area from time to time.  He seems to be voiding well and has had no dysuria or hematuria.  His urinalysis this morning is normal and his postvoid residual is only 40 cc.  Other past medical history: Former smoker  Medications: Baby aspirin, Prilosec  Allergies: None  Review of systems: As above.  Exam: Alert and oriented, normal appearance, normal vital signs.  Normal respirations, neuro grossly intact.  Normal sphincter tone, no rectal mass or impaction, no palpable prostate tissue and no abnormality noted  Assessment: History of adenocarcinoma the prostate, chronic constipation-he needs to drink less coffee, eliminate his wine at dinner and eat more fruit and vegetables in addition to taking his MiraLAX  Follow-up in 6 months with me with PSA and for digital rectal exam    Again, thank you for allowing me to participate in the care of your patient.      Sincerely,    Sudheer Jurado MD

## 2020-07-15 NOTE — LETTER
7/15/2020        RE: Dae Carpenter  [address]  [facility] [address]        Dae Carpenter is an 85-year-old gentleman who was treated with external radiation therapy 2-1/2 years ago for grade 3+4 adenocarcinoma of the prostate, clinical stage T1c and PSA 4.1.  His PSA is now 0.46.  His lucila was last year at 0.28.  He has been having electric shocks in the pelvic area from time to time.  He seems to be voiding well and has had no dysuria or hematuria.  His urinalysis this morning is normal and his postvoid residual is only 40 cc.  Other past medical history: Former smoker  Medications: Baby aspirin, Prilosec  Allergies: None  Review of systems: As above.  Exam: Alert and oriented, normal appearance, normal vital signs.  Normal respirations, neuro grossly intact.  Normal sphincter tone, no rectal mass or impaction, no palpable prostate tissue and no abnormality noted  Assessment: History of adenocarcinoma the prostate, chronic constipation-he needs to drink less coffee, eliminate his wine at dinner and eat more fruit and vegetables in addition to taking his MiraLAX  Follow-up in 6 months with me with PSA and for digital rectal exam      Sincerely,        Sudheer Jurado MD

## 2021-02-08 ENCOUNTER — TELEPHONE (OUTPATIENT)
Dept: UROLOGY | Facility: CLINIC | Age: 86
End: 2021-02-08

## 2021-02-08 NOTE — TELEPHONE ENCOUNTER
Patient states he is having LLQ abdominal pain  For some time now but has increased in intensity . Has hx of constipation but denies diverticulitis . Instructed patient to call primary for [facility] . If primary feels it is urologic related we can move appointment up.Laura Soler, LUKE

## 2021-02-19 ENCOUNTER — OFFICE VISIT (OUTPATIENT)
Dept: UROLOGY | Facility: CLINIC | Age: 86
End: 2021-02-19
Payer: COMMERCIAL

## 2021-02-19 VITALS
BODY MASS INDEX: 28.04 KG/M2 | SYSTOLIC BLOOD PRESSURE: 138 MMHG | WEIGHT: 185 LBS | HEIGHT: 68 IN | DIASTOLIC BLOOD PRESSURE: 82 MMHG

## 2021-02-19 DIAGNOSIS — C61 PROSTATE CANCER (H): ICD-10-CM

## 2021-02-19 DIAGNOSIS — C61 MALIGNANT NEOPLASM OF PROSTATE (H): ICD-10-CM

## 2021-02-19 LAB
ALBUMIN UR-MCNC: NEGATIVE MG/DL
APPEARANCE UR: CLEAR
BILIRUB UR QL STRIP: NEGATIVE
COLOR UR AUTO: YELLOW
GLUCOSE UR STRIP-MCNC: NEGATIVE MG/DL
HGB UR QL STRIP: NEGATIVE
KETONES UR STRIP-MCNC: NEGATIVE MG/DL
LEUKOCYTE ESTERASE UR QL STRIP: NEGATIVE
NITRATE UR QL: NEGATIVE
PH UR STRIP: 5.5 PH (ref 5–7)
PSA SERPL-MCNC: 0.23 NG/ML (ref 0–4)
SOURCE: NORMAL
SP GR UR STRIP: 1.02 (ref 1–1.03)
UROBILINOGEN UR STRIP-ACNC: 0.2 EU/DL (ref 0.2–1)

## 2021-02-19 PROCEDURE — 99212 OFFICE O/P EST SF 10 MIN: CPT | Performed by: UROLOGY

## 2021-02-19 PROCEDURE — 81003 URINALYSIS AUTO W/O SCOPE: CPT | Performed by: UROLOGY

## 2021-02-19 PROCEDURE — 84153 ASSAY OF PSA TOTAL: CPT | Performed by: UROLOGY

## 2021-02-19 ASSESSMENT — MIFFLIN-ST. JEOR: SCORE: 1493.65

## 2021-02-19 ASSESSMENT — PAIN SCALES - GENERAL: PAINLEVEL: NO PAIN (0)

## 2021-02-19 NOTE — PROGRESS NOTES
Dae Carpenter is an 86-year-old gentleman who was treated with radiation 3 years ago for a grade 3+4 adenocarcinoma the prostate, clinical stage T1c.  PSA at diagnosis was 4.1.  His PSA is now 2.3.  He is having no trouble voiding.  Other past medical history: Former smoker, GERD, COPD  Medications: Albuterol, baby aspirin, DuoNeb solution, Prilosec  Allergies: None  Exam: Alert and oriented, normal appearance, normal vital signs.  Normal respirations, neuro grossly intact.  Assessment: Stable PSA after external beam radiation therapy  Plan: Follow-up in 6 months with PSA.  Yearly after 5 years of follow-up

## 2021-02-19 NOTE — LETTER
Date:February 24, 2021      Patient was self referred, no letter generated. Do not send.        [facility] Health Information

## 2021-02-19 NOTE — LETTER
2/19/2021       RE: Dae Carpenter  [address]  [facility] [address]     Dear Colleague,    Thank you for referring your patient, Dae Carpenter, to the [facility] UROLOGY CLINIC [place] at [facility]. Please see a copy of my visit note below.    Dae Carpenter is an 86-year-old gentleman who was treated with radiation 3 years ago for a grade 3+4 adenocarcinoma the prostate, clinical stage T1c.  PSA at diagnosis was 4.1.  His PSA is now 2.3.  He is having no trouble voiding.  Other past medical history: Former smoker, GERD, COPD  Medications: Albuterol, baby aspirin, DuoNeb solution, Prilosec  Allergies: None  Exam: Alert and oriented, normal appearance, normal vital signs.  Normal respirations, neuro grossly intact.  Assessment: Stable PSA after external beam radiation therapy  Plan: Follow-up in 6 months with PSA.  Yearly after 5 years of follow-up      Again, thank you for allowing me to participate in the care of your patient.      Sincerely,    Sudheer Jurado MD

## 2021-10-11 ENCOUNTER — OFFICE VISIT (OUTPATIENT)
Dept: UROLOGY | Facility: CLINIC | Age: 86
End: 2021-10-11
Payer: COMMERCIAL

## 2021-10-11 VITALS
DIASTOLIC BLOOD PRESSURE: 70 MMHG | SYSTOLIC BLOOD PRESSURE: 130 MMHG | HEIGHT: 68 IN | WEIGHT: 185 LBS | OXYGEN SATURATION: 94 % | BODY MASS INDEX: 28.04 KG/M2 | HEART RATE: 63 BPM

## 2021-10-11 DIAGNOSIS — R35.1 NOCTURIA: ICD-10-CM

## 2021-10-11 DIAGNOSIS — C61 PROSTATE CANCER (H): Primary | ICD-10-CM

## 2021-10-11 LAB — PSA SERPL-MCNC: 0.24 UG/L (ref 0–4)

## 2021-10-11 PROCEDURE — 99213 OFFICE O/P EST LOW 20 MIN: CPT | Performed by: STUDENT IN AN ORGANIZED HEALTH CARE EDUCATION/TRAINING PROGRAM

## 2021-10-11 PROCEDURE — 36415 COLL VENOUS BLD VENIPUNCTURE: CPT | Performed by: STUDENT IN AN ORGANIZED HEALTH CARE EDUCATION/TRAINING PROGRAM

## 2021-10-11 PROCEDURE — 84153 ASSAY OF PSA TOTAL: CPT | Performed by: STUDENT IN AN ORGANIZED HEALTH CARE EDUCATION/TRAINING PROGRAM

## 2021-10-11 RX ORDER — ZINC GLUCONATE 50 MG
50 TABLET ORAL
COMMUNITY
Start: 2021-03-02

## 2021-10-11 ASSESSMENT — PAIN SCALES - GENERAL: PAINLEVEL: NO PAIN (0)

## 2021-10-11 ASSESSMENT — MIFFLIN-ST. JEOR: SCORE: 1493.65

## 2021-10-11 NOTE — LETTER
"10/11/2021       RE: Dae Carpenter  [address] [place] [id number]     Dear Colleague,    Thank you for referring your patient, Dae Carpenter, to the [facility] UROLOGY CLINIC [place] at [facility]. Please see a copy of my visit note below.    CHIEF COMPLAINT   Dae Carpenter who is a 86 year old male returns today for follow-up of 3+4=7 prostate cancer s/p EBRT 2017.      HPI   Dae Carpenter is a 86 year old male who presents with a history of Amanda 3+4=7 prostate cancer s/p EBRT 2017.     fromer [facility] patient    Denies any gross hematuria    C/o urinary frequency and nocturia. He declines medical management    PHYSICAL EXAM  Patient is a 86 year old  male   Vitals: Blood pressure 130/70, pulse 63, height 1.727 m (5' 8\"), weight 83.9 kg (185 lb), SpO2 94 %.  Body mass index is 28.13 kg/m .  General Appearance Adult:   Alert, no acute distress, oriented  HENT: throat/mouth:normal, good dentition  Lungs: no respiratory distress, or pursed lip breathing  Heart: No obvious jugular venous distension present  Abdomen: soft, nontender, no organomegaly or masses  Musculoskeltal: extremities normal, no peripheral edema  Skin: no suspicious lesions or rashes  Neuro: Alert, oriented, speech and mentation normal  Psych: affect and mood normal  Gait: Normal      Component PSA Diag Urologic Phys PSA   Latest Ref Rng & Units 0.00 - 4.00 ng/mL 0.00 - 4.00 ug/L   1/29/2018 1.60    7/27/2018 0.80    1/30/2019 0.48    7/10/2019 0.28    1/8/2020 0.36    7/14/2020  0.49   2/19/2021 0.23    10/11/2021  0.24       ASSESSMENT and PLAN  87 yo M with h/o Maanda 3+4=7 prostate cancer s/p EBRT 2017, with good PSA response, PSA remains low at 0.24 ng/ml. Discussed moving to annual PSA followup at this point. He has mild urinary frequency and nocturia but is not bothered enough by this to pursue medical management at this time.    Follow up 1 year with " PSA    Antonio Collazo MD   [facility] Urology  [facility] Phone: [phone]

## 2021-10-11 NOTE — PROGRESS NOTES
"CHIEF COMPLAINT   Dae Carpenter who is a 86 year old male returns today for follow-up of 3+4=7 prostate cancer s/p EBRT 2017.      HPI   Dae Carpenter is a 86 year old male who presents with a history of Amanda 3+4=7 prostate cancer s/p EBRT 2017.     fromer [facility] patient    Denies any gross hematuria    C/o urinary frequency and nocturia. He declines medical management    PHYSICAL EXAM  Patient is a 86 year old  male   Vitals: Blood pressure 130/70, pulse 63, height 1.727 m (5' 8\"), weight 83.9 kg (185 lb), SpO2 94 %.  Body mass index is 28.13 kg/m .  General Appearance Adult:   Alert, no acute distress, oriented  HENT: throat/mouth:normal, good dentition  Lungs: no respiratory distress, or pursed lip breathing  Heart: No obvious jugular venous distension present  Abdomen: soft, nontender, no organomegaly or masses  Musculoskeltal: extremities normal, no peripheral edema  Skin: no suspicious lesions or rashes  Neuro: Alert, oriented, speech and mentation normal  Psych: affect and mood normal  Gait: Normal      Component PSA Diag Urologic Phys PSA   Latest Ref Rng & Units 0.00 - 4.00 ng/mL 0.00 - 4.00 ug/L   1/29/2018 1.60    7/27/2018 0.80    1/30/2019 0.48    7/10/2019 0.28    1/8/2020 0.36    7/14/2020  0.49   2/19/2021 0.23    10/11/2021  0.24       ASSESSMENT and PLAN  87 yo M with h/o Prentiss 3+4=7 prostate cancer s/p EBRT 2017, with good PSA response, PSA remains low at 0.24 ng/ml. Discussed moving to annual PSA followup at this point. He has mild urinary frequency and nocturia but is not bothered enough by this to pursue medical management at this time.    Follow up 1 year with PSA    Antonio Collazo MD   [facility] Urology  [facility] Phone: [phone]    "

## 2021-10-11 NOTE — NURSING NOTE
Chief Complaint   Patient presents with     Prostate Cancer     Enlarged Prostate     Rona Leroy

## 2022-11-07 ENCOUNTER — OFFICE VISIT (OUTPATIENT)
Dept: UROLOGY | Facility: CLINIC | Age: 87
End: 2022-11-07
Payer: COMMERCIAL

## 2022-11-07 VITALS
DIASTOLIC BLOOD PRESSURE: 70 MMHG | SYSTOLIC BLOOD PRESSURE: 130 MMHG | HEIGHT: 68 IN | HEART RATE: 75 BPM | OXYGEN SATURATION: 96 % | BODY MASS INDEX: 28.13 KG/M2

## 2022-11-07 DIAGNOSIS — C61 PROSTATE CANCER (H): Primary | ICD-10-CM

## 2022-11-07 DIAGNOSIS — R35.1 NOCTURIA: ICD-10-CM

## 2022-11-07 LAB — PSA SERPL-MCNC: 0.21 UG/L (ref 0–4)

## 2022-11-07 PROCEDURE — 36415 COLL VENOUS BLD VENIPUNCTURE: CPT | Performed by: STUDENT IN AN ORGANIZED HEALTH CARE EDUCATION/TRAINING PROGRAM

## 2022-11-07 PROCEDURE — 84153 ASSAY OF PSA TOTAL: CPT | Performed by: STUDENT IN AN ORGANIZED HEALTH CARE EDUCATION/TRAINING PROGRAM

## 2022-11-07 PROCEDURE — 99213 OFFICE O/P EST LOW 20 MIN: CPT | Performed by: STUDENT IN AN ORGANIZED HEALTH CARE EDUCATION/TRAINING PROGRAM

## 2022-11-07 ASSESSMENT — PAIN SCALES - GENERAL: PAINLEVEL: NO PAIN (0)

## 2022-11-07 NOTE — PROGRESS NOTES
"CHIEF COMPLAINT   Dae Carpenter who is a 87 year old male returns today for follow-up of 3+4=7 prostate cancer s/p EBRT 2017.      HPI   Dae Carpenter is a 87 year old male returns today for follow-up of 3+4=7 prostate cancer s/p EBRT 2017.      Denies any new urinary symptoms or gross hematuria    He has nocturia 4-5x a night but he isn't bothered by this    PHYSICAL EXAM  Patient is a 87 year old  male   Vitals: Blood pressure 130/70, pulse 75, height 1.727 m (5' 8\"), SpO2 96 %.  Body mass index is 28.13 kg/m .  General Appearance Adult:   Alert, no acute distress, oriented  HENT: throat/mouth:normal, good dentition  Lungs: no respiratory distress, or pursed lip breathing  Heart: No obvious jugular venous distension present  Abdomen: nondistended  Musculoskeltal: extremities normal, no peripheral edema  Skin: no suspicious lesions or rashes  Neuro: Alert, oriented, speech and mentation normal  Psych: affect and mood normal  Gait: Normal    Component PSA Diag Urologic Phys PSA   Latest Ref Rng & Units 0.00 - 4.00 ng/mL 0.00 - 4.00 ug/L   1/29/2018 1.60    7/27/2018 0.80    1/30/2019 0.48    7/10/2019 0.28    1/8/2020 0.36    7/14/2020  0.49   2/19/2021 0.23    10/11/2021  0.24   11/7/2022  0.21       ASSESSMENT and PLAN  87 year old male returns today for follow-up of 3+4=7 prostate cancer s/p EBRT 2017.      PSA remains low at 0.21, new lucila it would appear.    Re: nocturia, patient denies any significant fluid intake at night. He will continue to monitor    - continue annual PSA monitoring  - return 1 year with PSA, UA, PVR, AUA symptom score    Antonio Collazo MD   [facility] Urology  [facility] Phone: [phone]    "

## 2022-11-07 NOTE — NURSING NOTE
Chief Complaint   Patient presents with     Hx of Prostate Cx     Patient here today for SD PSA and Exam in office         SVETLANA Ramey

## 2022-11-07 NOTE — LETTER
"11/7/2022       RE: Dae Carpenter  [address] [place] [phone]     Dear Colleague,    Thank you for referring your patient, Dae Carpenter, to the [facility] UROLOGY CLINIC [place] at [facility]. Please see a copy of my visit note below.    CHIEF COMPLAINT   Dae Carpenter who is a 87 year old male returns today for follow-up of 3+4=7 prostate cancer s/p EBRT 2017.      HPI   Dae Carpenter is a 87 year old male returns today for follow-up of 3+4=7 prostate cancer s/p EBRT 2017.      Denies any new urinary symptoms or gross hematuria    He has nocturia 4-5x a night but he isn't bothered by this    PHYSICAL EXAM  Patient is a 87 year old  male   Vitals: Blood pressure 130/70, pulse 75, height 1.727 m (5' 8\"), SpO2 96 %.  Body mass index is 28.13 kg/m .  General Appearance Adult:   Alert, no acute distress, oriented  HENT: throat/mouth:normal, good dentition  Lungs: no respiratory distress, or pursed lip breathing  Heart: No obvious jugular venous distension present  Abdomen: nondistended  Musculoskeltal: extremities normal, no peripheral edema  Skin: no suspicious lesions or rashes  Neuro: Alert, oriented, speech and mentation normal  Psych: affect and mood normal  Gait: Normal    Component PSA Diag Urologic Phys PSA   Latest Ref Rng & Units 0.00 - 4.00 ng/mL 0.00 - 4.00 ug/L   1/29/2018 1.60    7/27/2018 0.80    1/30/2019 0.48    7/10/2019 0.28    1/8/2020 0.36    7/14/2020  0.49   2/19/2021 0.23    10/11/2021  0.24   11/7/2022  0.21       ASSESSMENT and PLAN  87 year old male returns today for follow-up of 3+4=7 prostate cancer s/p EBRT 2017.      PSA remains low at 0.21, new lucila it would appear.    Re: nocturia, patient denies any significant fluid intake at night. He will continue to monitor    - continue annual PSA monitoring  - return 1 year with PSA, UA, PVR, AUA symptom score    Antonio Collazo MD   [facility] Urology  " [facility] Phone: [phone]

## 2023-11-20 ENCOUNTER — OFFICE VISIT (OUTPATIENT)
Dept: UROLOGY | Facility: CLINIC | Age: 88
End: 2023-11-20
Payer: COMMERCIAL

## 2023-11-20 VITALS
OXYGEN SATURATION: 93 % | DIASTOLIC BLOOD PRESSURE: 81 MMHG | WEIGHT: 182 LBS | HEART RATE: 78 BPM | BODY MASS INDEX: 27.58 KG/M2 | HEIGHT: 68 IN | SYSTOLIC BLOOD PRESSURE: 156 MMHG

## 2023-11-20 DIAGNOSIS — R35.1 NOCTURIA: ICD-10-CM

## 2023-11-20 DIAGNOSIS — C61 PROSTATE CANCER (H): Primary | ICD-10-CM

## 2023-11-20 LAB
ALBUMIN UR-MCNC: NEGATIVE MG/DL
APPEARANCE UR: CLEAR
BILIRUB UR QL STRIP: NEGATIVE
COLOR UR AUTO: YELLOW
GLUCOSE UR STRIP-MCNC: NEGATIVE MG/DL
HGB UR QL STRIP: NEGATIVE
KETONES UR STRIP-MCNC: NEGATIVE MG/DL
LEUKOCYTE ESTERASE UR QL STRIP: NEGATIVE
NITRATE UR QL: NEGATIVE
PH UR STRIP: 6.5 [PH] (ref 5–7)
PSA SERPL-MCNC: 0.32 UG/L (ref 0–4)
RESIDUAL VOLUME (RV) (EXTERNAL): NORMAL
SP GR UR STRIP: 1.01 (ref 1–1.03)
UROBILINOGEN UR STRIP-ACNC: 0.2 E.U./DL

## 2023-11-20 PROCEDURE — 99213 OFFICE O/P EST LOW 20 MIN: CPT | Mod: 25 | Performed by: STUDENT IN AN ORGANIZED HEALTH CARE EDUCATION/TRAINING PROGRAM

## 2023-11-20 PROCEDURE — 81003 URINALYSIS AUTO W/O SCOPE: CPT | Mod: QW | Performed by: STUDENT IN AN ORGANIZED HEALTH CARE EDUCATION/TRAINING PROGRAM

## 2023-11-20 PROCEDURE — 84153 ASSAY OF PSA TOTAL: CPT | Performed by: STUDENT IN AN ORGANIZED HEALTH CARE EDUCATION/TRAINING PROGRAM

## 2023-11-20 PROCEDURE — 36415 COLL VENOUS BLD VENIPUNCTURE: CPT | Performed by: STUDENT IN AN ORGANIZED HEALTH CARE EDUCATION/TRAINING PROGRAM

## 2023-11-20 PROCEDURE — 51798 US URINE CAPACITY MEASURE: CPT | Performed by: STUDENT IN AN ORGANIZED HEALTH CARE EDUCATION/TRAINING PROGRAM

## 2023-11-20 ASSESSMENT — PAIN SCALES - GENERAL: PAINLEVEL: NO PAIN (0)

## 2023-11-20 NOTE — LETTER
"11/20/2023       RE: Dae Carpenter  [address] [place] [phone]     Dear Colleague,    Thank you for referring your patient, Dae Carpenter, to the [facility] UROLOGY CLINIC [place] at [facility]. Please see a copy of my visit note below.    CHIEF COMPLAINT   Dae Carpenter who is a 88 year old male returns today for follow-up of Amanda 3+4=7 prostate cancer s/p EBRT 2017.      HPI   Dae Carpenter is a 88 year old male returns today for follow-up of Amanda 3+4=7 prostate cancer s/p EBRT 2017    AUA symptom score 9-1-5-5-0-0-3 = 17 qol mostly satisfied    Nocturia x3 doesn't really bother him. Not on any alpha blocker or 5ARI    PHYSICAL EXAM  Patient is a 88 year old  male   Vitals: Blood pressure (!) 156/81, pulse 78, height 1.727 m (5' 8\"), weight 82.6 kg (182 lb), SpO2 93%.  Body mass index is 27.67 kg/m .  General Appearance Adult:   Alert, no acute distress, oriented  HENT: throat/mouth:normal, good dentition  Lungs: no respiratory distress, or pursed lip breathing  Heart: No obvious jugular venous distension present  Abdomen: nondistended  Musculoskeltal: extremities normal, no peripheral edema  Skin: no suspicious lesions or rashes  Neuro: Alert, oriented, speech and mentation normal  Psych: affect and mood normal  Gait: Normal  : deferred     PSA Diag Urologic Phys PSA   Latest Ref Rng 0.00 - 4.00 ng/mL 0.00 - 4.00 ug/L   1/29/2018  8:39 AM 1.60     7/27/2018  7:53 AM 0.80     1/30/2019  8:20 AM 0.48     7/10/2019  7:59 AM 0.28     1/8/2020  8:18 AM 0.36     7/14/2020  9:35 AM  0.49    2/19/2021  2:47 PM 0.23     10/11/2021  2:25 PM  0.24    11/7/2022  2:07 PM  0.21    11/20/2023  12:33 PM  0.32      Component      Latest Ref Rng 11/20/2023  12:46 PM   Color Urine      Colorless, Straw, Light Yellow, Yellow  Yellow    Appearance Urine      Clear  Clear    Glucose Urine      Negative mg/dL Negative    Bilirubin Urine     "  Negative  Negative    Ketones Urine      Negative mg/dL Negative    Specific Gravity Urine      1.003 - 1.035  1.010    Blood Urine      Negative  Negative    pH Urine      5.0 - 7.0  6.5    Protein Albumin Urine      Negative mg/dL Negative    Urobilinogen Urine      0.2, 1.0 E.U./dL 0.2    Nitrite Urine      Negative  Negative    Leukocyte Esterase Urine      Negative  Negative      PVR 17 ml        ASSESSMENT and PLAN  88 year old male returns today for follow-up of Cathay 3+4=7 prostate cancer s/p EBRT 2017    Emptying bladder well, PVR 17 ml. UA today unremarkable. Stable LUTS, not bothered by it    Re: prostate cancer, PSA remains low at 0.32 ng/ml. Lucila 0.21 ng/ml in 2022. Will continue to monitor PSA annually. If significant rise, would consider ADT at that time (three consecutive rises, or lucila + 2 PSA)    - return 1 year with PSA prior    Antonio Collazo MD   [facility] Urology  [facility] Phone: [phone]

## 2023-11-20 NOTE — NURSING NOTE
Chief Complaint   Patient presents with    Prostate Cancer     Here in clinic for a psa today    Pvr is 17ml done with bladder scan,3 times a night to urinate   Terry Bazan, CMA

## 2023-11-20 NOTE — PROGRESS NOTES
"CHIEF COMPLAINT   Dae Carpenter who is a 88 year old male returns today for follow-up of [place] 3+4=7 prostate cancer s/p EBRT 2017.      HPI   Dae Carpenter is a 88 year old male returns today for follow-up of [place] 3+4=7 prostate cancer s/p EBRT 2017    AUA symptom score 2-9-6-5-0-0-3 = 17 qol mostly satisfied    Nocturia x3 doesn't really bother him. Not on any alpha blocker or 5ARI    PHYSICAL EXAM  Patient is a 88 year old  male   Vitals: Blood pressure (!) 156/81, pulse 78, height 1.727 m (5' 8\"), weight 82.6 kg (182 lb), SpO2 93%.  Body mass index is 27.67 kg/m .  General Appearance Adult:   Alert, no acute distress, oriented  HENT: throat/mouth:normal, good dentition  Lungs: no respiratory distress, or pursed lip breathing  Heart: No obvious jugular venous distension present  Abdomen: nondistended  Musculoskeltal: extremities normal, no peripheral edema  Skin: no suspicious lesions or rashes  Neuro: Alert, oriented, speech and mentation normal  Psych: affect and mood normal  Gait: Normal  : deferred     PSA Diag Urologic Phys PSA   Latest Ref Rng 0.00 - 4.00 ng/mL 0.00 - 4.00 ug/L   1/29/2018  8:39 AM 1.60     7/27/2018  7:53 AM 0.80     1/30/2019  8:20 AM 0.48     7/10/2019  7:59 AM 0.28     1/8/2020  8:18 AM 0.36     7/14/2020  9:35 AM  0.49    2/19/2021  2:47 PM 0.23     10/11/2021  2:25 PM  0.24    11/7/2022  2:07 PM  0.21    11/20/2023  12:33 PM  0.32      Component      Latest Ref Rng 11/20/2023  12:46 PM   Color Urine      Colorless, Straw, Light Yellow, Yellow  Yellow    Appearance Urine      Clear  Clear    Glucose Urine      Negative mg/dL Negative    Bilirubin Urine      Negative  Negative    Ketones Urine      Negative mg/dL Negative    Specific Gravity Urine      1.003 - 1.035  1.010    Blood Urine      Negative  Negative    pH Urine      5.0 - 7.0  6.5    Protein Albumin Urine      Negative mg/dL Negative    Urobilinogen Urine      0.2, 1.0 E.U./dL 0.2    Nitrite Urine      Negative  " Negative    Leukocyte Esterase Urine      Negative  Negative      PVR 17 ml        ASSESSMENT and PLAN  88 year old male returns today for follow-up of [place] 3+4=7 prostate cancer s/p EBRT 2017    Emptying bladder well, PVR 17 ml. UA today unremarkable. Stable LUTS, not bothered by it    Re: prostate cancer, PSA remains low at 0.32 ng/ml. [place] 0.21 ng/ml in 2022. Will continue to monitor PSA annually. If significant rise, would consider ADT at that time (three consecutive rises, or lucila + 2 PSA)    - return 1 year with PSA prior    Antonio Collazo MD   [facility]  [facility] Phone: [phone]

## 2024-12-11 DIAGNOSIS — C61 PROSTATE CANCER (H): Primary | ICD-10-CM

## 2024-12-12 ENCOUNTER — LAB (OUTPATIENT)
Dept: LAB | Facility: CLINIC | Age: 89
End: 2024-12-12
Payer: COMMERCIAL

## 2024-12-12 DIAGNOSIS — C61 PROSTATE CANCER (H): ICD-10-CM
